# Patient Record
Sex: MALE | Race: WHITE | NOT HISPANIC OR LATINO | Employment: OTHER | URBAN - METROPOLITAN AREA
[De-identification: names, ages, dates, MRNs, and addresses within clinical notes are randomized per-mention and may not be internally consistent; named-entity substitution may affect disease eponyms.]

---

## 2017-01-03 ENCOUNTER — TELEPHONE (OUTPATIENT)
Dept: NEUROSURGERY | Facility: CLINIC | Age: 72
End: 2017-01-03

## 2017-01-03 NOTE — TELEPHONE ENCOUNTER
----- Message from Jennifer Salas sent at 1/3/2017  4:50 PM EST -----  Contact: [phone]  PATIENT CALLING TO REPORT ON HIS CONDITION.  STATES HE IS DOING VERY WELL POST-SURGERY.

## 2017-01-30 ENCOUNTER — HOSPITAL ENCOUNTER (OUTPATIENT)
Dept: GENERAL RADIOLOGY | Facility: HOSPITAL | Age: 72
Discharge: HOME | End: 2017-01-30
Admitting: PHYSICIAN ASSISTANT

## 2017-01-30 ENCOUNTER — OFFICE VISIT (OUTPATIENT)
Dept: NEUROSURGERY | Facility: CLINIC | Age: 72
End: 2017-01-30

## 2017-01-30 VITALS
HEIGHT: 71 IN | BODY MASS INDEX: 18.06 KG/M2 | TEMPERATURE: 97.2 F | SYSTOLIC BLOOD PRESSURE: 160 MMHG | DIASTOLIC BLOOD PRESSURE: 92 MMHG | RESPIRATION RATE: 20 BRPM | OXYGEN SATURATION: 98 % | HEART RATE: 78 BPM | WEIGHT: 129 LBS

## 2017-01-30 DIAGNOSIS — Z79.4 TYPE 2 DIABETES MELLITUS WITH HYPERGLYCEMIA, WITH LONG-TERM CURRENT USE OF INSULIN (HCC): Chronic | ICD-10-CM

## 2017-01-30 DIAGNOSIS — I48.20 CHRONIC ATRIAL FIBRILLATION (HCC): Primary | ICD-10-CM

## 2017-01-30 DIAGNOSIS — M48.062 SPINAL STENOSIS OF LUMBAR REGION WITH NEUROGENIC CLAUDICATION: ICD-10-CM

## 2017-01-30 DIAGNOSIS — E11.65 TYPE 2 DIABETES MELLITUS WITH HYPERGLYCEMIA, WITH LONG-TERM CURRENT USE OF INSULIN (HCC): Chronic | ICD-10-CM

## 2017-01-30 PROCEDURE — 99024 POSTOP FOLLOW-UP VISIT: CPT | Performed by: PHYSICIAN ASSISTANT

## 2017-01-30 PROCEDURE — 72100 X-RAY EXAM L-S SPINE 2/3 VWS: CPT

## 2017-01-30 RX ORDER — TAMSULOSIN HYDROCHLORIDE 0.4 MG/1
CAPSULE ORAL
Refills: 4 | COMMUNITY
Start: 2017-01-10

## 2017-01-30 NOTE — MR AVS SNAPSHOT
Garry Lynch   1/30/2017 11:00 AM   Office Visit    Dept Phone:  [phone]   Encounter #:  [id number]    Provider:  Vaishali Aguilar PA-C   Department:  [facility]                Your Full Care Plan              Today's Medication Changes          These changes are accurate as of: 1/30/17 11:39 AM.  If you have any questions, ask your nurse or doctor.               Stop taking medication(s)listed here:     cefpodoxime 100 MG tablet   Commonly known as:  VANTIN   Stopped by:  Vaishali Aguilar PA-C                      Your Updated Medication List          This list is accurate as of: 1/30/17 11:39 AM.  Always use your most recent med list.                amitriptyline 25 MG tablet   Commonly known as:  ELAVIL       aspirin 81 MG chewable tablet       atorvastatin 80 MG tablet   Commonly known as:  LIPITOR       insulin lispro 100 UNIT/ML injection   Commonly known as:  humaLOG       levothyroxine 112 MCG tablet   Commonly known as:  SYNTHROID, LEVOTHROID       losartan 25 MG tablet   Commonly known as:  COZAAR       neomycin-bacitracin-polymyxin b 3.5-400-5000 ointment ointment   Apply  topically Every 8 (Eight) Hours.       ONE TOUCH ULTRA TEST test strip   Generic drug:  glucose blood       tamsulosin 0.4 MG capsule 24 hr capsule   Commonly known as:  FLOMAX               We Performed the Following     Ambulatory Referral to Physical Therapy     XR Spine Lumbar AP & Lateral       You Were Diagnosed With        Codes Comments    Chronic atrial fibrillation    -  Primary ICD-10-CM: I48.2  ICD-9-CM: 427.31     Type 2 diabetes mellitus with hyperglycemia, with long-term current use of insulin     ICD-10-CM: E11.65, Z79.4  ICD-9-CM: 250.00, 790.29, V58.67     Spinal stenosis of lumbar region with neurogenic claudication     ICD-10-CM: M48.06  ICD-9-CM: 724.03       Instructions     None    Patient Instructions History      Upcoming Appointments   "   Visit Type Date Time Department    POST-OP 1/30/2017 11:00 AM [facility]      MyChart Signup     Our records indicate that you have declined [facility] MyChart signup. If you would like to sign up for [employer], please email [email or web address] or call [phone] to obtain an activation code.             Other Info from Your Visit           Allergies     Gabapentin  Other (See Comments)    \"PUT ME TO SLEEP, COULD NOT WAKE UP, WIFE HAD TO TAKE ME TO HOSPITAL TO GET MEDICINE TO WAKE ME UP\"    Levaquin [Levofloxacin]  Other (See Comments), Myalgia    COULDN'T STAND ON LEGS, SO WEAK      Reason for Visit     Post-op Patient denies pain, no signs of infection, states no concerns at today's visit       Vital Signs     Blood Pressure Pulse Temperature Respirations Height Weight    160/92 (BP Location: Left arm, Patient Position: Sitting) 78 97.2 °F (36.2 °C) (Temporal Artery ) 20 71\" (180.3 cm) 129 lb (58.5 kg)    Oxygen Saturation Body Mass Index Smoking Status             98% 17.99 kg/m2 Current Every Day Smoker         Problems and Diagnoses Noted     Atrial fibrillation (irregular heartbeat)    Diabetes    Spinal stenosis of lumbar region with neurogenic claudication        "

## 2017-01-30 NOTE — PROGRESS NOTES
"Garry Lynch    1945 01/30/2017    [id number]    HPI:  Mr. Lynch is status post lumbar fusion at L2 3 on 2/15/2016.  The patient was having symptoms of neurogenic claudication secondary to spinal stenosis prior to his surgery and he has now had complete resolution of his leg pain.  His activities have been slowly progressing.  His nutritional intake could be better and we have discussed that.  His blood sugars are under very good control.  He was recently seen by Dr. Cardozo with a good report on his hemoglobin A1c.    Past Medical History   Diagnosis Date   • Arthritis    • Atrial fibrillation      JAN. 2014, TREATED AT [facility], NEVER HAD ANY PROBLEMS SINCE. SEES DR. SANCHEZ IN [place].   • COPD (chronic obstructive pulmonary disease)    • Diabetes mellitus      IDDM, CHECKS BS AT HOME 5/6X PER DAY, DOES NOT FOLLOW A DIABETIC DIET   • Disease of thyroid gland    • Hemochromatosis    • Hyperlipidemia    • Hypertension    • Insulin pump in place    • Neuromuscular disorder    • Peripheral vascular disease      3 STENTS IN RIGHT LEG   • Wears dentures        Allergies   Allergen Reactions   • Gabapentin Other (See Comments)     \"PUT ME TO SLEEP, COULD NOT WAKE UP, WIFE HAD TO TAKE ME TO HOSPITAL TO GET MEDICINE TO WAKE ME UP\"   • Levaquin [Levofloxacin] Other (See Comments) and Myalgia     COULDN'T STAND ON LEGS, SO WEAK         Current Outpatient Prescriptions:   •  amitriptyline (ELAVIL) 25 MG tablet, Take 25 mg by mouth Every Night., Disp: , Rfl:   •  aspirin 81 MG chewable tablet, Chew 81 mg Daily., Disp: , Rfl:   •  atorvastatin (LIPITOR) 80 MG tablet, Take 80 mg by mouth Daily., Disp: , Rfl:   •  cefpodoxime (VANTIN) 100 MG tablet, Take 1 tablet by mouth 2 (Two) Times a Day., Disp: 14 tablet, Rfl: 0  •  insulin lispro (humaLOG) 100 UNIT/ML injection, Inject  under the skin 3 (Three) Times a Day Before Meals. CALCULATES HIS CARB INTAKE AT EACH MEAL AND GIVES HIM SELF INSULIN BASED ON THAT, " Disp: , Rfl:   •  levothyroxine (SYNTHROID, LEVOTHROID) 112 MCG tablet, Take 112 mcg by mouth Daily., Disp: , Rfl:   •  losartan (COZAAR) 25 MG tablet, Take 25 mg by mouth Daily., Disp: , Rfl:   •  neomycin-bacitracin-polymyxin b (NEOSPORIN) 3.5-400-5000 ointment ointment, Apply  topically Every 8 (Eight) Hours., Disp: 2 tube, Rfl: 0  •  ONE TOUCH ULTRA TEST test strip, TEST FIVE TIMES A DAY, Disp: , Rfl: 11    Social History     Social History   • Marital status:      Spouse name: N/A   • Number of children: N/A   • Years of education: N/A     Social History Main Topics   • Smoking status: Current Every Day Smoker     Packs/day: 0.50     Years: 50.00     Types: Cigarettes   • Smokeless tobacco: Never Used      Comment: BROCHURES GIVEN   • Alcohol use 3.6 oz/week     6 Cans of beer per week   • Drug use: No   • Sexual activity: Not on file     Other Topics Concern   • Not on file     Social History Narrative       No family history on file.    Review of Systems   Constitutional: Negative for activity change, appetite change, chills, diaphoresis, fatigue, fever and unexpected weight change.   HENT: Negative for congestion, dental problem, drooling, ear discharge, ear pain, facial swelling, hearing loss, mouth sores, nosebleeds, postnasal drip, rhinorrhea, sinus pressure, sneezing, sore throat, tinnitus, trouble swallowing and voice change.    Eyes: Negative for photophobia, pain, discharge, redness, itching and visual disturbance.   Respiratory: Negative for apnea, cough, choking, chest tightness, shortness of breath, wheezing and stridor.    Cardiovascular: Negative for chest pain, palpitations and leg swelling.   Gastrointestinal: Negative for abdominal distention, abdominal pain, anal bleeding, blood in stool, constipation, diarrhea, nausea, rectal pain and vomiting.   Endocrine: Negative for cold intolerance, heat intolerance, polydipsia, polyphagia and polyuria.   Genitourinary: Negative for decreased  urine volume, difficulty urinating, dysuria, enuresis, flank pain, frequency, genital sores, hematuria and urgency.   Musculoskeletal: Negative for arthralgias, back pain, gait problem, joint swelling, myalgias, neck pain and neck stiffness.   Skin: Negative for color change, pallor, rash and wound.   Allergic/Immunologic: Negative for environmental allergies, food allergies and immunocompromised state.   Neurological: Negative for dizziness, tremors, seizures, syncope, facial asymmetry, speech difficulty, weakness, light-headedness, numbness and headaches.   Hematological: Negative for adenopathy. Bruises/bleeds easily.   Psychiatric/Behavioral: Negative for agitation, behavioral problems, confusion, decreased concentration, dysphoric mood, hallucinations, self-injury, sleep disturbance and suicidal ideas. The patient is not nervous/anxious and is not hyperactive.      PE:  Physical Exam   His surgical wound is well-healed.    Neurologic Exam  Motor examination is intact in the lower extremities.  His gait reveals a mild weakness in the left extremity.  He has been receiving physical therapy at home.    MDM   We have discussed attending outpatient physical therapy for a few sessions and then he can go home and do therapy himself and he is in agreement.  We have encouraged him on fluid intake and increasing his calories a little bit.  I sent the patient for AP and lateral films of the lumbar spine and they show excellent placement of the interbody cage.    I advised the patient of the risks in continuing to use tobacco, and I provided this patient with smoking cessation educational materials.  I also discussed how to quit smoking and the patient has not expressed the willingness to quit.      During this visit, I spent 3-10 mintues counseling the patient regarding smoking cessation.     We will see the patient back in follow-up in 6 weeks    Vaishali Aguilar PA-C